# Patient Record
Sex: MALE | Race: WHITE | Employment: FULL TIME | URBAN - METROPOLITAN AREA
[De-identification: names, ages, dates, MRNs, and addresses within clinical notes are randomized per-mention and may not be internally consistent; named-entity substitution may affect disease eponyms.]

---

## 2022-03-17 ENCOUNTER — HOSPITAL ENCOUNTER (EMERGENCY)
Age: 57
Discharge: HOME | End: 2022-03-17
Attending: EMERGENCY MEDICINE
Payer: COMMERCIAL

## 2022-03-17 VITALS
BODY MASS INDEX: 26.05 KG/M2 | TEMPERATURE: 98 F | OXYGEN SATURATION: 97 % | DIASTOLIC BLOOD PRESSURE: 91 MMHG | RESPIRATION RATE: 16 BRPM | HEIGHT: 70 IN | HEART RATE: 98 BPM | SYSTOLIC BLOOD PRESSURE: 153 MMHG | WEIGHT: 182 LBS

## 2022-03-17 DIAGNOSIS — T15.90XA FOREIGN BODY IN EYE, UNSPECIFIED LATERALITY, INITIAL ENCOUNTER: Primary | ICD-10-CM

## 2022-03-17 DIAGNOSIS — S05.00XA CORNEAL ABRASION, UNSPECIFIED LATERALITY, INITIAL ENCOUNTER: ICD-10-CM

## 2022-03-17 PROCEDURE — 99283 EMERGENCY DEPT VISIT LOW MDM: CPT

## 2022-03-17 PROCEDURE — 6370000000 HC RX 637 (ALT 250 FOR IP): Performed by: EMERGENCY MEDICINE

## 2022-03-17 RX ORDER — HYDROCODONE BITARTRATE AND ACETAMINOPHEN 5; 325 MG/1; MG/1
1 TABLET ORAL EVERY 6 HOURS PRN
Qty: 4 TABLET | Refills: 0 | Status: SHIPPED | OUTPATIENT
Start: 2022-03-17 | End: 2022-03-20

## 2022-03-17 RX ORDER — TOBRAMYCIN 3 MG/ML
2 SOLUTION/ DROPS OPHTHALMIC EVERY 6 HOURS
Qty: 15 ML | Refills: 0 | Status: SHIPPED | OUTPATIENT
Start: 2022-03-17 | End: 2022-03-27

## 2022-03-17 RX ADMIN — FLUORESCEIN SODIUM 1 MG: 1 STRIP OPHTHALMIC at 14:45

## 2022-03-17 NOTE — ED TRIAGE NOTES
Patient was blowing out the radiator on a dozer at work today with safety glasses on when he felt something go in his left eye, he irrigated with water but still feels as though something is in his eye. This is a workers comp injury.

## 2022-03-17 NOTE — ED PROVIDER NOTES
CC/HPI: 71-year-old male to the emergency department chief complaint of left eye irritation. Patient states he was using an air hose cleaning out his dose or at work when something flew between his mask and his safety goggles. Patient states he immediately flushed out his eye at length. However he still feels like there may be a foreign body. He has had some watering and irritation. No blurred vision or double vision. VITALS/PMH/PSH: Reviewed per nurses notes    REVIEW OF SYSTEMS: As in chief complaint history of present illness, otherwise all other systems are reviewed and negative the total 10 systems reviewed    PHYSICAL EXAM:  GEN: Pt alert and oriented, no acute distress  HEENT:         Normocephalic/Atramatic        PERRL, EOMI mild appearing left eye irritation. After 2 drops of tetracaine were placed no obvious foreign body was noted with eyelid inversion or examining the surface of the eye. With fluorescein staining there were 2 small flecks 1 on the upper eyelid and one on the lower eyelid that appeared to be small clear foreign bodies that the stain collected around. Those were removed without difficulty with a sterile Q-tip. There were no other foreign bodies noted. There was a superficial area of fluorescein uptake in a pattern consistent with foreign body on the undersurface of the lower eyelid. No dendritic pattern. NECK: Nontender, no signs of trauma, no lymphadenopathy  HEART: Reg S1/S2, without murmer, rub or gallop  LUNGS: Clear to auscultation bilaterally, respirations even and unlabored  MUSCULOSKELETAL/EXTREMITITES:  No signs of trauma, cyanosis or edema. No CVA tenderness  LYMPH: no peripheral lympadenopathy noted  SKIN:  Warm & dry, no rash  NEUROLOGIC:  Alert and oriented. Speech clear    Medical decision making/ED course;  Patient main stable throughout the course of his emergency department stay.     Clinical impression;  1) eyelid foreign body left eye  2) superficial corneal abrasion    Disposition/plan; patient discharged home in stable condition given discharge instructions on eye foreign body and corneal abrasion. Patient had quite a bit of irritation before the tetracaine was placed so I did prescribe him for Norco but advised him to only take it if the Tylenol or ibuprofen were not helping. Patient voiced understanding agreement with treatment plan. Given prescription for Tobrex. Advised to follow-up with optometrist unless symptoms are resolving in 24 to 48 hours.   Return for any worsening or changes to symptoms     Urban Valenzuela DO  03/17/22 1520

## 2023-05-02 ENCOUNTER — HOSPITAL ENCOUNTER (EMERGENCY)
Age: 58
Discharge: HOME | End: 2023-05-02
Attending: EMERGENCY MEDICINE
Payer: COMMERCIAL

## 2023-05-02 VITALS
TEMPERATURE: 98.6 F | BODY MASS INDEX: 26.36 KG/M2 | HEIGHT: 69 IN | RESPIRATION RATE: 20 BRPM | DIASTOLIC BLOOD PRESSURE: 91 MMHG | WEIGHT: 178 LBS | HEART RATE: 80 BPM | OXYGEN SATURATION: 98 % | SYSTOLIC BLOOD PRESSURE: 150 MMHG

## 2023-05-02 DIAGNOSIS — L03.90 CELLULITIS, UNSPECIFIED CELLULITIS SITE: Primary | ICD-10-CM

## 2023-05-02 DIAGNOSIS — I89.1 LYMPHANGITIS: ICD-10-CM

## 2023-05-02 PROCEDURE — 2580000003 HC RX 258: Performed by: EMERGENCY MEDICINE

## 2023-05-02 PROCEDURE — 96365 THER/PROPH/DIAG IV INF INIT: CPT

## 2023-05-02 PROCEDURE — 6360000002 HC RX W HCPCS: Performed by: EMERGENCY MEDICINE

## 2023-05-02 PROCEDURE — 99284 EMERGENCY DEPT VISIT MOD MDM: CPT

## 2023-05-02 RX ORDER — SULFAMETHOXAZOLE AND TRIMETHOPRIM 800; 160 MG/1; MG/1
1 TABLET ORAL 2 TIMES DAILY
Qty: 20 TABLET | Refills: 0 | Status: SHIPPED | OUTPATIENT
Start: 2023-05-02 | End: 2023-05-12

## 2023-05-02 RX ORDER — AMOXICILLIN AND CLAVULANATE POTASSIUM 875; 125 MG/1; MG/1
1 TABLET, FILM COATED ORAL 2 TIMES DAILY
Qty: 20 TABLET | Refills: 0 | Status: SHIPPED | OUTPATIENT
Start: 2023-05-02 | End: 2023-05-12

## 2023-05-02 RX ORDER — HYDROCODONE BITARTRATE AND ACETAMINOPHEN 5; 325 MG/1; MG/1
1 TABLET ORAL EVERY 6 HOURS PRN
Qty: 10 TABLET | Refills: 0 | Status: SHIPPED | OUTPATIENT
Start: 2023-05-02 | End: 2023-05-05

## 2023-05-02 RX ADMIN — CEFTRIAXONE 1000 MG: 1 INJECTION, POWDER, FOR SOLUTION INTRAMUSCULAR; INTRAVENOUS at 17:05

## 2023-05-02 ASSESSMENT — PAIN DESCRIPTION - FREQUENCY: FREQUENCY: CONTINUOUS

## 2023-05-02 ASSESSMENT — PAIN DESCRIPTION - ORIENTATION: ORIENTATION: RIGHT

## 2023-05-02 ASSESSMENT — PAIN DESCRIPTION - PAIN TYPE: TYPE: CHRONIC PAIN

## 2023-05-02 ASSESSMENT — LIFESTYLE VARIABLES
HOW MANY STANDARD DRINKS CONTAINING ALCOHOL DO YOU HAVE ON A TYPICAL DAY: PATIENT DOES NOT DRINK
HOW OFTEN DO YOU HAVE A DRINK CONTAINING ALCOHOL: NEVER

## 2023-05-02 ASSESSMENT — PAIN DESCRIPTION - LOCATION: LOCATION: ARM;HAND

## 2023-05-02 ASSESSMENT — PAIN - FUNCTIONAL ASSESSMENT: PAIN_FUNCTIONAL_ASSESSMENT: 0-10

## 2023-05-02 ASSESSMENT — PAIN SCALES - GENERAL: PAINLEVEL_OUTOF10: 4

## 2023-05-02 NOTE — ED TRIAGE NOTES
Pt presents to ED with c/o possible infection in rt hand traveling up to elbow. States was bit by brown recluse spider 10-15 years ago and fang was not fully removed, so every so often site of bite on rt palm will become infected. States pain aching/burning 4/10. Able to move arm/wrist/hand freely. Pulse strong. Skin intact. Does have erythema line from wrist to antecubital area. Afebrile.

## 2023-05-02 NOTE — ED PROVIDER NOTES
[facility] ED  EMERGENCY DEPARTMENT ENCOUNTER      Pt Name: Augustina Vasquez  MRN: [id number]  [place] 1965  Date of evaluation: 5/2/2023  Provider: Janak Helm DO    CHIEF COMPLAINT       Chief Complaint   Patient presents with    Insect Bite     Doreatha Grave a few year old part of fang still in hand     Numbness     In right hand and arm due to spider bite      Chief complaint: Spider bite infection  History of chief complaint: This 69-year-old gentleman presents to the emergency department complaining of getting bit by a brown recluse spider 15 years ago, states they believe there is a piece of its fang left in there, states he did see the surgeon and he had a area of tissue excised but still gets intermittent recurrent infection at the site tracks up his arm. Patient states he does get a little tingling pain sensation down into the fifth digit with the episodes. Patient states he has had about 8 episodes over the past 15 years. Patient states he bumped his hand pretty hard the other day right over the spot states it swelled up at the site and has red streaking moving up the arm. States he usually gets a dose of IV antibiotics at home on the Augmentin and a Medrol Dosepak. Patient states he has felt fine otherwise patient denies any fevers or chills no nausea vomiting weak or dizzy feeling. Nursing Notes were reviewed. REVIEW OF SYSTEMS    (2-9 systems for level 4, 10 or more for level 5)     Review of Systems  Pertinent findings documented in the history of present illness  Except as noted above the remainder of the review of systems was reviewed and negative. PAST MEDICAL HISTORY     Past Medical History:   Diagnosis Date    Alyssia Median recluse spider bite     Rt hand         SURGICAL HISTORY     History reviewed. No pertinent surgical history. CURRENT MEDICATIONS       Previous Medications    No medications on file       ALLERGIES     Patient has no known allergies.     FAMILY